# Patient Record
Sex: MALE | Race: BLACK OR AFRICAN AMERICAN | RURAL
[De-identification: names, ages, dates, MRNs, and addresses within clinical notes are randomized per-mention and may not be internally consistent; named-entity substitution may affect disease eponyms.]

---

## 2022-02-16 ENCOUNTER — NEW PATIENT (OUTPATIENT)
Dept: RURAL CLINIC 3 | Facility: CLINIC | Age: 62
End: 2022-02-16

## 2022-02-16 DIAGNOSIS — H18.233: ICD-10-CM

## 2022-02-16 DIAGNOSIS — H25.13: ICD-10-CM

## 2022-02-16 PROCEDURE — 99203 OFFICE O/P NEW LOW 30 MIN: CPT

## 2022-02-16 ASSESSMENT — TONOMETRY
OS_IOP_MMHG: 11
OD_IOP_MMHG: 11

## 2022-02-16 ASSESSMENT — VISUAL ACUITY
OS_PH: 20/90
OD_PH: 20/60-1

## 2022-02-16 NOTE — PATIENT DISCUSSION
Corneal edema OU secondary to over wear of contacts. D/C CL's X 1 week. Start Prednisolone Acetate QID OU X 1 week, Rx sent to pharmacy. Continue to monitor.

## 2022-02-23 ENCOUNTER — FOLLOW UP (OUTPATIENT)
Dept: RURAL CLINIC 3 | Facility: CLINIC | Age: 62
End: 2022-02-23

## 2022-02-23 DIAGNOSIS — H18.233: ICD-10-CM

## 2022-02-23 PROCEDURE — 99213 OFFICE O/P EST LOW 20 MIN: CPT

## 2022-02-23 ASSESSMENT — VISUAL ACUITY
OS_PH: 20/200
OD_PH: 20/90
OD_SC: 20/100

## 2022-02-23 NOTE — PATIENT DISCUSSION
Corneal edema OU secondary to over wear of contacts. D/C CL's. Continue Prednisolone Acetate QID OU. Recommend refer to Dr. Taylor Bianchi for further evaluation.

## 2023-12-04 ENCOUNTER — CONSULTATION/EVALUATION (OUTPATIENT)
Dept: RURAL CLINIC 3 | Facility: CLINIC | Age: 63
End: 2023-12-04

## 2023-12-04 DIAGNOSIS — H25.13: ICD-10-CM

## 2023-12-04 PROCEDURE — 92136 OPHTHALMIC BIOMETRY: CPT

## 2023-12-04 PROCEDURE — 92025 CPTRIZED CORNEAL TOPOGRAPHY: CPT

## 2023-12-04 PROCEDURE — 99214 OFFICE O/P EST MOD 30 MIN: CPT

## 2023-12-04 PROCEDURE — 92134 CPTRZ OPH DX IMG PST SGM RTA: CPT

## 2023-12-04 ASSESSMENT — VISUAL ACUITY
OD_PH: 20/40
OS_SC: 20/150
OD_PAM: 20/40
OS_CC: 20/50
OD_CC: 20/50
OD_CC: 20/70
OS_BAT: 20/300
OD_SC: 20/300
OS_CC: 20/60
OS_PH: 20/40
OS_AM: 20/25

## 2023-12-04 ASSESSMENT — TONOMETRY
OS_IOP_MMHG: 16
OD_IOP_MMHG: 16

## 2023-12-20 ASSESSMENT — VISUAL ACUITY
OD_CC: 20/70
OD_SC: 20/300
OD_CC: 20/50
OD_PAM: 20/40
OS_CC: 20/60
OS_BAT: 20/300
OS_PH: 20/40
OS_SC: 20/150
OD_PH: 20/40
OS_AM: 20/25
OS_CC: 20/50

## 2023-12-22 ENCOUNTER — PRE-OP/H&P (OUTPATIENT)
Dept: RURAL CLINIC 3 | Facility: CLINIC | Age: 63
End: 2023-12-22

## 2023-12-22 VITALS
HEART RATE: 60 BPM | HEIGHT: 69 IN | DIASTOLIC BLOOD PRESSURE: 84 MMHG | WEIGHT: 175 LBS | BODY MASS INDEX: 25.92 KG/M2 | SYSTOLIC BLOOD PRESSURE: 140 MMHG

## 2023-12-22 DIAGNOSIS — I10: ICD-10-CM

## 2023-12-22 DIAGNOSIS — C61: ICD-10-CM

## 2023-12-22 DIAGNOSIS — J45.20: ICD-10-CM

## 2023-12-22 DIAGNOSIS — Z01.818: ICD-10-CM

## 2023-12-22 PROCEDURE — 99213 OFFICE O/P EST LOW 20 MIN: CPT

## 2024-01-05 ENCOUNTER — SURGERY/PROCEDURE (OUTPATIENT)
Dept: RURAL CLINIC 4 | Facility: CLINIC | Age: 64
End: 2024-01-05

## 2024-01-05 ENCOUNTER — POST OP/EVAL OF SECOND EYE (OUTPATIENT)
Dept: RURAL CLINIC 3 | Facility: CLINIC | Age: 64
End: 2024-01-05

## 2024-01-05 DIAGNOSIS — H25.11: ICD-10-CM

## 2024-01-05 DIAGNOSIS — H25.12: ICD-10-CM

## 2024-01-05 PROCEDURE — 66984 XCAPSL CTRC RMVL W/O ECP: CPT

## 2024-01-05 PROCEDURE — 99213 OFFICE O/P EST LOW 20 MIN: CPT

## 2024-01-05 ASSESSMENT — VISUAL ACUITY
OD_CC: 20/50
OS_PH: 20/90
OD_CC: 20/70
OD_PAM: 20/40
OD_PH: 20/40
OD_SC: 20/300
OS_SC: 20/150

## 2024-01-05 ASSESSMENT — TONOMETRY
OD_IOP_MMHG: 16
OS_IOP_MMHG: 24

## 2024-01-19 ENCOUNTER — POST-OP (OUTPATIENT)
Dept: RURAL CLINIC 3 | Facility: CLINIC | Age: 64
End: 2024-01-19

## 2024-01-19 ENCOUNTER — SURGERY/PROCEDURE (OUTPATIENT)
Dept: RURAL CLINIC 4 | Facility: CLINIC | Age: 64
End: 2024-01-19

## 2024-01-19 DIAGNOSIS — Z98.42: ICD-10-CM

## 2024-01-19 DIAGNOSIS — H25.11: ICD-10-CM

## 2024-01-19 DIAGNOSIS — Z98.41: ICD-10-CM

## 2024-01-19 PROCEDURE — 99024 POSTOP FOLLOW-UP VISIT: CPT

## 2024-01-19 PROCEDURE — 66984 XCAPSL CTRC RMVL W/O ECP: CPT

## 2024-01-19 ASSESSMENT — VISUAL ACUITY
OD_SC: 20/150
OS_PH: 20/40
OS_SC: 20/50

## 2024-01-19 ASSESSMENT — TONOMETRY
OS_IOP_MMHG: 16
OD_IOP_MMHG: 16